# Patient Record
Sex: FEMALE | Race: WHITE | NOT HISPANIC OR LATINO | Employment: FULL TIME | URBAN - METROPOLITAN AREA
[De-identification: names, ages, dates, MRNs, and addresses within clinical notes are randomized per-mention and may not be internally consistent; named-entity substitution may affect disease eponyms.]

---

## 2018-03-14 ENCOUNTER — TELEPHONE (OUTPATIENT)
Dept: OPHTHALMOLOGY | Facility: CLINIC | Age: 48
End: 2018-03-14

## 2018-03-14 NOTE — TELEPHONE ENCOUNTER
----- Message from Louisa Colbertgham sent at 3/14/2018  6:47 AM CDT -----  Contact: self  Appointment Request From: Carmelita Carey    With Provider: Other - (see comments)    Would Accept With:Only the person I've selected    Preferred Date Range: From 3/12/2018 To 7/31/2018    Preferred Times: Any    Reason for visit: Request an Appt    Comments:  I saw Dr. Daniel Frank approx. 5 years ago for an assessment of my corneas.  He noted that I should go back to see him in about 5 years to determine the progression of Fuch's disease.  Thank you in advance for scheduling an appt.,  Carmelita Carey  [phone]

## 2018-04-25 ENCOUNTER — OFFICE VISIT (OUTPATIENT)
Dept: OPHTHALMOLOGY | Facility: CLINIC | Age: 48
End: 2018-04-25
Payer: COMMERCIAL

## 2018-04-25 DIAGNOSIS — H18.519 FUCHS' CORNEAL DYSTROPHY: Primary | ICD-10-CM

## 2018-04-25 PROCEDURE — 99999 PR PBB SHADOW E&M-EST. PATIENT-LVL II: CPT | Mod: PBBFAC,,, | Performed by: OPHTHALMOLOGY

## 2018-04-25 PROCEDURE — 92004 COMPRE OPH EXAM NEW PT 1/>: CPT | Mod: S$GLB,,, | Performed by: OPHTHALMOLOGY

## 2018-04-25 RX ORDER — FLUTICASONE PROPIONATE 50 MCG
SPRAY, SUSPENSION (ML) NASAL
Refills: 0 | COMMUNITY
Start: 2018-03-24

## 2018-04-25 RX ORDER — SPIRONOLACTONE 100 MG/1
TABLET, FILM COATED ORAL
Refills: 0 | COMMUNITY
Start: 2018-04-02

## 2018-04-25 RX ORDER — LEVOTHYROXINE SODIUM 88 UG/1
88 TABLET ORAL DAILY
Refills: 0 | COMMUNITY
Start: 2018-04-02

## 2018-04-25 NOTE — PROGRESS NOTES
HPI     Last seen: 08/10/2011 Dr. Frank // Dr. Laci PEPE 09/27/2017    Patient states she was told to come back in about 5 years to joana the   progression of her Fuch's. Denies flashes, floaters, diplopia,   photophobia, glare, HA's, or pain.    Eye meds:  Thera Tears PRN OU// usually QD      Last edited by Minna Gill on 4/25/2018  8:37 AM. (History)            Assessment /Plan     For exam results, see Encounter Report.    Fuchs' corneal dystrophy      Visual loss from corneal edema and its slowly progressive clinical course were discussed. We will monitor the clinical condition for now, repeat measurements as needed, and possibly consider surgery at a later date  Normal vision, CCT

## 2024-06-05 ENCOUNTER — OFFICE VISIT (OUTPATIENT)
Dept: OPHTHALMOLOGY | Facility: CLINIC | Age: 54
End: 2024-06-05
Payer: COMMERCIAL

## 2024-06-05 DIAGNOSIS — H18.513 FUCHS' CORNEAL DYSTROPHY OF BOTH EYES: Primary | ICD-10-CM

## 2024-06-05 PROCEDURE — 1160F RVW MEDS BY RX/DR IN RCRD: CPT | Mod: CPTII,S$GLB,, | Performed by: OPHTHALMOLOGY

## 2024-06-05 PROCEDURE — 1159F MED LIST DOCD IN RCRD: CPT | Mod: CPTII,S$GLB,, | Performed by: OPHTHALMOLOGY

## 2024-06-05 PROCEDURE — 99999 PR PBB SHADOW E&M-NEW PATIENT-LVL III: CPT | Mod: PBBFAC,,, | Performed by: OPHTHALMOLOGY

## 2024-06-05 PROCEDURE — 92004 COMPRE OPH EXAM NEW PT 1/>: CPT | Mod: S$GLB,,, | Performed by: OPHTHALMOLOGY

## 2024-06-05 NOTE — PROGRESS NOTES
HPI    Patient present today for Fuchs follow up   Pt state VA stable, eyes are fine.   Check up   No complaints at this time   Last edited by Jack Forrester on 6/5/2024  9:20 AM.            Assessment /Plan     For exam results, see Encounter Report.    Fuchs' corneal dystrophy of both eyes      Visual loss from corneal edema and its slowly progressive clinical course were discussed. We will monitor the clinical condition for now, repeat measurements as needed, and possibly consider surgery at a later date    2018 TO TODAY WITH VERY LITTLE CHANGE, SO MONITOR PERIODICALLY